# Patient Record
Sex: FEMALE | Race: WHITE | URBAN - METROPOLITAN AREA
[De-identification: names, ages, dates, MRNs, and addresses within clinical notes are randomized per-mention and may not be internally consistent; named-entity substitution may affect disease eponyms.]

---

## 2016-12-29 PROCEDURE — 93228 REMOTE 30 DAY ECG REV/REPORT: CPT | Performed by: INTERNAL MEDICINE

## 2017-01-09 ENCOUNTER — TELEPHONE (OUTPATIENT)
Dept: CARDIOLOGY CLINIC | Age: 42
End: 2017-01-09

## 2017-01-17 DIAGNOSIS — I47.1 SVT (SUPRAVENTRICULAR TACHYCARDIA) (HCC): ICD-10-CM

## 2017-08-01 ENCOUNTER — TELEPHONE (OUTPATIENT)
Dept: CARDIOLOGY CLINIC | Age: 42
End: 2017-08-01

## 2018-03-18 DIAGNOSIS — Z51.81 ENCOUNTER FOR MONITORING FLECAINIDE THERAPY: Primary | ICD-10-CM

## 2018-03-18 DIAGNOSIS — Z79.899 ENCOUNTER FOR MONITORING FLECAINIDE THERAPY: Primary | ICD-10-CM

## 2018-03-19 RX ORDER — DILTIAZEM HYDROCHLORIDE 120 MG/1
CAPSULE, EXTENDED RELEASE ORAL
Qty: 90 CAPSULE | Refills: 0 | Status: SHIPPED | OUTPATIENT
Start: 2018-03-19 | End: 2018-10-02 | Stop reason: SDUPTHER

## 2018-03-19 RX ORDER — FLECAINIDE ACETATE 50 MG/1
TABLET ORAL
Qty: 180 TABLET | Refills: 0 | Status: SHIPPED | OUTPATIENT
Start: 2018-03-19 | End: 2018-11-28 | Stop reason: ALTCHOICE

## 2018-03-19 NOTE — TELEPHONE ENCOUNTER
Last O/V:  6/13/16  Next O/V:  X- Last office note states to f/u prn    Last Labs:  BMP:  5/4/16    I called and spoke with the patient. She is going to have lab work done on Friday afternoon @ [facility] lab. Ordered placed. She states she will be calling back soon as well to schedule a yearly follow up.

## 2018-05-18 ENCOUNTER — OFFICE VISIT (OUTPATIENT)
Dept: FAMILY MEDICINE CLINIC | Age: 43
End: 2018-05-18

## 2018-05-18 VITALS
DIASTOLIC BLOOD PRESSURE: 76 MMHG | SYSTOLIC BLOOD PRESSURE: 120 MMHG | TEMPERATURE: 98.7 F | WEIGHT: 214.2 LBS | BODY MASS INDEX: 33.62 KG/M2 | HEIGHT: 67 IN

## 2018-05-18 DIAGNOSIS — Z13.1 SCREENING FOR DIABETES MELLITUS: ICD-10-CM

## 2018-05-18 DIAGNOSIS — Z00.00 PHYSICAL EXAM, ANNUAL: Primary | ICD-10-CM

## 2018-05-18 DIAGNOSIS — R53.82 CHRONIC FATIGUE: ICD-10-CM

## 2018-05-18 DIAGNOSIS — Z51.81 ENCOUNTER FOR MONITORING FLECAINIDE THERAPY: ICD-10-CM

## 2018-05-18 DIAGNOSIS — Z79.899 ENCOUNTER FOR MONITORING FLECAINIDE THERAPY: ICD-10-CM

## 2018-05-18 DIAGNOSIS — Z13.220 SCREENING FOR LIPID DISORDERS: ICD-10-CM

## 2018-05-18 LAB
ANION GAP SERPL CALCULATED.3IONS-SCNC: 16 MMOL/L (ref 3–16)
BASOPHILS ABSOLUTE: 0.1 K/UL (ref 0–0.2)
BASOPHILS RELATIVE PERCENT: 0.9 %
BUN BLDV-MCNC: 12 MG/DL (ref 7–20)
CALCIUM SERPL-MCNC: 9.2 MG/DL (ref 8.3–10.6)
CHLORIDE BLD-SCNC: 100 MMOL/L (ref 99–110)
CHOLESTEROL, TOTAL: 207 MG/DL (ref 0–199)
CO2: 25 MMOL/L (ref 21–32)
CREAT SERPL-MCNC: 0.8 MG/DL (ref 0.6–1.1)
EOSINOPHILS ABSOLUTE: 0.1 K/UL (ref 0–0.6)
EOSINOPHILS RELATIVE PERCENT: 1 %
GFR AFRICAN AMERICAN: >60
GFR NON-AFRICAN AMERICAN: >60
GLUCOSE BLD-MCNC: 75 MG/DL (ref 70–99)
GLUCOSE BLD-MCNC: 79 MG/DL (ref 70–99)
HCT VFR BLD CALC: 43.9 % (ref 36–48)
HDLC SERPL-MCNC: 55 MG/DL (ref 40–60)
HEMOGLOBIN: 15 G/DL (ref 12–16)
LDL CHOLESTEROL CALCULATED: 126 MG/DL
LYMPHOCYTES ABSOLUTE: 2.2 K/UL (ref 1–5.1)
LYMPHOCYTES RELATIVE PERCENT: 30.8 %
MCH RBC QN AUTO: 31.4 PG (ref 26–34)
MCHC RBC AUTO-ENTMCNC: 34.1 G/DL (ref 31–36)
MCV RBC AUTO: 92.2 FL (ref 80–100)
MONOCYTES ABSOLUTE: 0.6 K/UL (ref 0–1.3)
MONOCYTES RELATIVE PERCENT: 8.3 %
NEUTROPHILS ABSOLUTE: 4.2 K/UL (ref 1.7–7.7)
NEUTROPHILS RELATIVE PERCENT: 59 %
PDW BLD-RTO: 12.8 % (ref 12.4–15.4)
PLATELET # BLD: 293 K/UL (ref 135–450)
PMV BLD AUTO: 8.4 FL (ref 5–10.5)
POTASSIUM SERPL-SCNC: 4.2 MMOL/L (ref 3.5–5.1)
RBC # BLD: 4.76 M/UL (ref 4–5.2)
SODIUM BLD-SCNC: 141 MMOL/L (ref 136–145)
TRIGL SERPL-MCNC: 131 MG/DL (ref 0–150)
TSH SERPL DL<=0.05 MIU/L-ACNC: 1.81 UIU/ML (ref 0.27–4.2)
VLDLC SERPL CALC-MCNC: 26 MG/DL
WBC # BLD: 7.1 K/UL (ref 4–11)

## 2018-05-18 PROCEDURE — 99396 PREV VISIT EST AGE 40-64: CPT | Performed by: INTERNAL MEDICINE

## 2018-05-18 ASSESSMENT — PATIENT HEALTH QUESTIONNAIRE - PHQ9
SUM OF ALL RESPONSES TO PHQ QUESTIONS 1-9: 0
1. LITTLE INTEREST OR PLEASURE IN DOING THINGS: 0
2. FEELING DOWN, DEPRESSED OR HOPELESS: 0
SUM OF ALL RESPONSES TO PHQ9 QUESTIONS 1 & 2: 0

## 2018-05-18 ASSESSMENT — ENCOUNTER SYMPTOMS
SINUS PRESSURE: 0
RHINORRHEA: 0
VOMITING: 0
WHEEZING: 0
SINUS PAIN: 0
APNEA: 0
SORE THROAT: 0
DIARRHEA: 0
ABDOMINAL PAIN: 0
NAUSEA: 0
SHORTNESS OF BREATH: 0
COUGH: 0
BLOOD IN STOOL: 0
CONSTIPATION: 0

## 2018-10-02 RX ORDER — DILTIAZEM HYDROCHLORIDE 120 MG/1
120 CAPSULE, COATED, EXTENDED RELEASE ORAL DAILY
Qty: 30 CAPSULE | Refills: 1 | Status: SHIPPED | OUTPATIENT
Start: 2018-10-02 | End: 2018-10-02 | Stop reason: SDUPTHER

## 2018-11-28 ENCOUNTER — OFFICE VISIT (OUTPATIENT)
Dept: CARDIOLOGY CLINIC | Age: 43
End: 2018-11-28
Payer: COMMERCIAL

## 2018-11-28 VITALS
SYSTOLIC BLOOD PRESSURE: 100 MMHG | WEIGHT: 200 LBS | OXYGEN SATURATION: 98 % | HEIGHT: 67 IN | BODY MASS INDEX: 31.39 KG/M2 | DIASTOLIC BLOOD PRESSURE: 68 MMHG | HEART RATE: 70 BPM

## 2018-11-28 DIAGNOSIS — I47.1 ATRIAL TACHYCARDIA (HCC): ICD-10-CM

## 2018-11-28 DIAGNOSIS — I47.1 SVT (SUPRAVENTRICULAR TACHYCARDIA) (HCC): Primary | ICD-10-CM

## 2018-11-28 PROCEDURE — 99214 OFFICE O/P EST MOD 30 MIN: CPT | Performed by: INTERNAL MEDICINE

## 2018-11-28 PROCEDURE — 93000 ELECTROCARDIOGRAM COMPLETE: CPT | Performed by: INTERNAL MEDICINE

## 2021-01-06 ENCOUNTER — OFFICE VISIT (OUTPATIENT)
Dept: FAMILY MEDICINE CLINIC | Age: 46
End: 2021-01-06
Payer: COMMERCIAL

## 2021-01-06 VITALS
BODY MASS INDEX: 34.88 KG/M2 | SYSTOLIC BLOOD PRESSURE: 108 MMHG | TEMPERATURE: 97.3 F | HEIGHT: 67 IN | WEIGHT: 222.2 LBS | DIASTOLIC BLOOD PRESSURE: 60 MMHG

## 2021-01-06 DIAGNOSIS — Z00.00 PHYSICAL EXAM: Primary | ICD-10-CM

## 2021-01-06 DIAGNOSIS — Z13.1 SCREENING FOR DIABETES MELLITUS: ICD-10-CM

## 2021-01-06 DIAGNOSIS — Z13.220 SCREENING FOR LIPID DISORDERS: ICD-10-CM

## 2021-01-06 DIAGNOSIS — M25.50 ARTHRALGIA, UNSPECIFIED JOINT: ICD-10-CM

## 2021-01-06 PROCEDURE — 99396 PREV VISIT EST AGE 40-64: CPT | Performed by: INTERNAL MEDICINE

## 2021-01-06 SDOH — ECONOMIC STABILITY: INCOME INSECURITY: HOW HARD IS IT FOR YOU TO PAY FOR THE VERY BASICS LIKE FOOD, HOUSING, MEDICAL CARE, AND HEATING?: NOT HARD AT ALL

## 2021-01-06 SDOH — ECONOMIC STABILITY: TRANSPORTATION INSECURITY
IN THE PAST 12 MONTHS, HAS LACK OF TRANSPORTATION KEPT YOU FROM MEETINGS, WORK, OR FROM GETTING THINGS NEEDED FOR DAILY LIVING?: NO

## 2021-01-06 SDOH — ECONOMIC STABILITY: FOOD INSECURITY: WITHIN THE PAST 12 MONTHS, THE FOOD YOU BOUGHT JUST DIDN'T LAST AND YOU DIDN'T HAVE MONEY TO GET MORE.: NEVER TRUE

## 2021-01-06 ASSESSMENT — ENCOUNTER SYMPTOMS
APNEA: 0
BLOOD IN STOOL: 0
WHEEZING: 0
CONSTIPATION: 0
VOMITING: 0
SINUS PRESSURE: 0
NAUSEA: 0
DIARRHEA: 0
SORE THROAT: 0
ABDOMINAL PAIN: 0
COUGH: 0
SINUS PAIN: 0
SHORTNESS OF BREATH: 0
RHINORRHEA: 0

## 2021-01-06 ASSESSMENT — PATIENT HEALTH QUESTIONNAIRE - PHQ9
SUM OF ALL RESPONSES TO PHQ QUESTIONS 1-9: 0
SUM OF ALL RESPONSES TO PHQ QUESTIONS 1-9: 0

## 2021-01-06 NOTE — PROGRESS NOTES
Respiratory: Negative for apnea, cough, shortness of breath and wheezing. Cardiovascular: Negative for chest pain and palpitations. Gastrointestinal: Negative for abdominal pain, blood in stool, constipation, diarrhea, nausea and vomiting. Endocrine: Negative for polyuria. Genitourinary: Negative for dysuria, frequency, hematuria and urgency. Musculoskeletal: Negative for arthralgias and myalgias. Skin: Negative for rash. Neurological: Negative for dizziness, weakness, numbness and headaches. Hematological: Negative for adenopathy. Prior to Visit Medications    Medication Sig Taking?  Authorizing Provider   FLECAINIDE ACETATE PO Take by mouth daily Yes Historical Provider, MD   dilTIAZem HCl Coated Beads (DILTIAZEM CD PO) Take by mouth daily Yes Historical Provider, MD        Allergies   Allergen Reactions    Amoxicillin Rash       Past Medical History:   Diagnosis Date    Atrial tachycardia ([address]     s/p ablation    Palpitations        Past Surgical History:   Procedure Laterality Date    ATRIAL ABLATION SURGERY       SECTION      x 3    DILATION AND CURETTAGE OF UTERUS      JOINT REPLACEMENT      right hip    TONSILLECTOMY         Social History     Socioeconomic History    Marital status:      Spouse name: Not on file    Number of children: Not on file    Years of education: Not on file    Highest education level: Not on file   Occupational History    Not on file   Social Needs    Financial resource strain: Not hard at all   [employer] insecurity     Worry: Never true     Inability: Never true   [employer] needs     Medical: No     Non-medical: No   Tobacco Use    Smoking status: Never Smoker    Smokeless tobacco: Never Used   Substance and Sexual Activity    Alcohol use: Yes     Comment: occasional use    Drug use: No    Sexual activity: Not on file   Lifestyle    Physical activity     Days per week: Not on file     Minutes per session: Not on file  Stress: Not on file   Relationships    Social connections     Talks on phone: Not on file     Gets together: Not on file     Attends Mandaen service: Not on file     Active member of club or organization: Not on file     Attends meetings of clubs or organizations: Not on file     Relationship status: Not on file    Intimate partner violence     Fear of current or ex partner: Not on file     Emotionally abused: Not on file     Physically abused: Not on file     Forced sexual activity: Not on file   Other Topics Concern    Not on file   Social History Narrative    Not on file        Family History   Problem Relation Age of Onset    Diabetes Father     Cancer Father         throat cancer    Cancer Maternal Aunt         breast    Cancer Maternal Grandmother         breast/kidney       ADVANCE DIRECTIVE: N, <no information>    Vitals:    01/06/21 0852   Temp: 97.3 °F (36.3 °C)   TempSrc: Temporal   Weight: 222 lb 3.2 oz (100.8 kg)   Height: 5' 7\" (1.702 m)     Estimated body mass index is 34.8 kg/m² as calculated from the following:    Height as of this encounter: 5' 7\" (1.702 m). Weight as of this encounter: 222 lb 3.2 oz (100.8 kg). Physical Exam  Constitutional:       General: She is not in acute distress. Appearance: Normal appearance. She is not ill-appearing. HENT:      Head: Normocephalic. Right Ear: Tympanic membrane, ear canal and external ear normal.      Left Ear: Tympanic membrane, ear canal and external ear normal.   Eyes:      General: No scleral icterus. Right eye: No discharge. Left eye: No discharge. Extraocular Movements: Extraocular movements intact. Pupils: Pupils are equal, round, and reactive to light. Cardiovascular:      Rate and Rhythm: Normal rate. Heart sounds: No murmur. Pulmonary:      Effort: No respiratory distress. Breath sounds: No wheezing or rales. Abdominal:      General: There is no distension. Tenderness: There is no abdominal tenderness. There is no guarding. Musculoskeletal:         General: No swelling or deformity. Skin:     Coloration: Skin is not jaundiced. Findings: No bruising. Neurological:      Mental Status: She is alert. Motor: No weakness. Gait: Gait normal.   Psychiatric:         Mood and Affect: Mood normal.         Behavior: Behavior normal.         Thought Content: Thought content normal.         Judgment: Judgment normal.         No flowsheet data found. Lab Results   Component Value Date    CHOL 207 05/18/2018    CHOL 219 02/26/2016    CHOL 169 05/30/2012    TRIG 131 05/18/2018    TRIG 94 02/26/2016    TRIG 114 05/30/2012    HDL 55 05/18/2018    HDL 55 02/26/2016    HDL 50 05/30/2012    LDLCALC 126 05/18/2018    LDLCALC 145 02/26/2016    LDLCALC 96 05/30/2012    GLUCOSE 75 05/18/2018    GLUCOSE 79 05/18/2018    LABA1C 5.1 02/15/2010       The ASCVD Risk score (Morales Ortez, et al., 2013) failed to calculate for the following reasons: The systolic blood pressure is missing      There is no immunization history on file for this patient. Health Maintenance   Topic Date Due    Hepatitis C screen  1975    HIV screen  07/29/1989    DTaP/Tdap/Td vaccine (1 - Tdap) 07/29/1993    Cervical cancer screen  07/29/1995    Flu vaccine (1) 09/01/2020    Lipid screen  05/18/2023    Hepatitis A vaccine  Aged Out    Hepatitis B vaccine  Aged Out    Hib vaccine  Aged Out    Meningococcal (ACWY) vaccine  Aged Out    Pneumococcal 0-64 years Vaccine  Aged Out       ASSESSMENT/PLAN:  1. Screening for lipid disorders  2. Screening for diabetes mellitus     Diagnosis Orders   1. Physical exam     2. Screening for lipid disorders  Lipid Panel   3. Screening for diabetes mellitus  Glucose, Fasting   4.  Arthralgia, unspecified joint  Rheumatoid Factor    Sedimentation Rate     Outpatient Encounter Medications as of 1/6/2021   Medication Sig Dispense Refill  FLECAINIDE ACETATE PO Take by mouth daily      dilTIAZem HCl Coated Beads (DILTIAZEM CD PO) Take by mouth daily      [DISCONTINUED] Probiotic Product (PRO-BIOTIC BLEND PO) Take by mouth daily       No facility-administered encounter medications on file as of 1/6/2021. Orders Placed This Encounter   Procedures    Lipid Panel     Standing Status:   Future     Standing Expiration Date:   1/6/2022     Order Specific Question:   Is Patient Fasting?/# of Hours     Answer:   12    Glucose, Fasting     Standing Status:   Future     Standing Expiration Date:   1/6/2022    Rheumatoid Factor     Standing Status:   Future     Standing Expiration Date:   1/6/2022    Sedimentation Rate     Standing Status:   Future     Standing Expiration Date:   1/6/2022       No follow-ups on file.     An electronic signature was used to authenticate this note.    --Jeff Castro,  on 1/6/2021 at 8:57 AM

## 2021-01-07 LAB
CHOLESTEROL, TOTAL: 226 MG/DL (ref 0–199)
GLUCOSE FASTING: 68 MG/DL (ref 70–99)
HDLC SERPL-MCNC: 52 MG/DL (ref 40–60)
LDL CHOLESTEROL CALCULATED: 151 MG/DL
RHEUMATOID FACTOR: <10 IU/ML
SEDIMENTATION RATE, ERYTHROCYTE: 14 MM/HR (ref 0–20)
TRIGL SERPL-MCNC: 116 MG/DL (ref 0–150)
VLDLC SERPL CALC-MCNC: 23 MG/DL

## 2021-08-18 ENCOUNTER — OFFICE VISIT (OUTPATIENT)
Dept: FAMILY MEDICINE CLINIC | Age: 46
End: 2021-08-18
Payer: COMMERCIAL

## 2021-08-18 VITALS
DIASTOLIC BLOOD PRESSURE: 66 MMHG | WEIGHT: 194.4 LBS | TEMPERATURE: 98 F | HEIGHT: 67 IN | BODY MASS INDEX: 30.51 KG/M2 | SYSTOLIC BLOOD PRESSURE: 108 MMHG

## 2021-08-18 DIAGNOSIS — I47.1 SVT (SUPRAVENTRICULAR TACHYCARDIA) (HCC): ICD-10-CM

## 2021-08-18 DIAGNOSIS — M25.552 LEFT HIP PAIN: Primary | ICD-10-CM

## 2021-08-18 PROCEDURE — 99242 OFF/OP CONSLTJ NEW/EST SF 20: CPT | Performed by: INTERNAL MEDICINE

## 2021-08-18 ASSESSMENT — ENCOUNTER SYMPTOMS
ABDOMINAL PAIN: 0
DIARRHEA: 0
APNEA: 0
NAUSEA: 0
VOMITING: 0
WHEEZING: 0
COUGH: 0
SORE THROAT: 0
SHORTNESS OF BREATH: 0
RHINORRHEA: 0
BLOOD IN STOOL: 0
CONSTIPATION: 0
SINUS PRESSURE: 0
SINUS PAIN: 0

## 2021-08-18 ASSESSMENT — PATIENT HEALTH QUESTIONNAIRE - PHQ9
SUM OF ALL RESPONSES TO PHQ9 QUESTIONS 1 & 2: 0
2. FEELING DOWN, DEPRESSED OR HOPELESS: 0
1. LITTLE INTEREST OR PLEASURE IN DOING THINGS: 0
SUM OF ALL RESPONSES TO PHQ QUESTIONS 1-9: 0

## 2021-08-18 NOTE — PATIENT INSTRUCTIONS
Thank you for choosing [facility]. Please bring a current list of medications to every appointment. Please contact your pharmacy for any prescription refill(s) that you are requesting.

## 2021-08-18 NOTE — PROGRESS NOTES
2021    Sachin Vasquez (:  1975) is a 51 y.o. female, here for a preventive medicine evaluation. Chief Complaint   Patient presents with    Pre-op Exam     LEFT DIRECT ANTERIOR TOTAL HIP ARTHROPLASTY with Dr. Kj Anderson on 2021  fax: Lucy Montesinos is a 51 y.o. female [facility] the [facility]:  Patient Active Problem List    Diagnosis Date Noted    Chronic fatigue 2018    [address]T [facility]raventricular [address]) [address] [address] 2016    Fatigue 2012     Current Outpatient Medications   Medication Sig Dispense Refill    FLECAINIDE ACETATE PO Take by mouth daily      dilTIAZem HCl Coated Beads (DILTIAZEM CD PO) Take by mouth daily       No current facility-administered medications for this visit. Allergies: Amoxicillin  Past Medical History:   Diagnosis Date    Atrial tachycardia (HCC)     s/p ablation    Palpitations      Past Surgical History:   Procedure Laterality Date    ATRIAL ABLATION SURGERY       SECTION      x 3    DILATION AND CURETTAGE OF UTERUS      JOINT REPLACEMENT      right hip    TONSILLECTOMY       Family History   Problem Relation Age of Onset    Diabetes Father     Cancer Father         throat cancer    Cancer Maternal Aunt         breast    Cancer Maternal Grandmother         breast/kidney     Social History     Tobacco Use    Smoking status: Never Smoker    Smokeless tobacco: Never Used   Substance Use Topics    Alcohol use: Yes     Comment: occasional use     Patient Active Problem List   Diagnosis    Fatigue    SVT (supraventricular tachycardia) (HCC)    Chronic fatigue       Review of Systems   Constitutional: Negative for chills, diaphoresis, fatigue and fever. HENT: Negative for congestion, postnasal drip, rhinorrhea, sinus pressure, sinus pain and sore throat. Eyes: Negative for visual disturbance. Respiratory: Negative for apnea, cough, shortness of breath and wheezing. Cardiovascular: Negative for chest pain and palpitations. Gastrointestinal: Negative for abdominal pain, blood in stool, constipation, diarrhea, nausea and vomiting. Endocrine: Negative for polyuria. Genitourinary: Negative for dysuria, frequency, hematuria and urgency. Musculoskeletal:        Chronic L hip pain    Skin: Negative for rash. Neurological: Negative for dizziness, syncope, weakness, light-headedness, numbness and headaches. Hematological: Negative for adenopathy. Prior to Visit Medications    Medication Sig Taking?  Authorizing Provider   FLECAINIDE ACETATE PO Take by mouth daily Yes Historical Provider, MD   dilTIAZem HCl Coated Beads (DILTIAZEM CD PO) Take by mouth daily Yes Historical Provider, MD        Allergies   Allergen Reactions    Amoxicillin Rash       Past Medical History:   Diagnosis Date    Atrial tachycardia ([address]     s/p ablation    Palpitations        Past Surgical History:   Procedure Laterality Date    ATRIAL ABLATION SURGERY       SECTION      x 3    DILATION AND CURETTAGE OF UTERUS      JOINT REPLACEMENT      right hip    TONSILLECTOMY         Social History     Socioeconomic History    Marital status:      Spouse name: Not on file    Number of children: Not on file    Years of education: Not on file    Highest education level: Not on file   Occupational History    Not on file   Tobacco Use    Smoking status: Never Smoker    Smokeless tobacco: Never Used   Substance and Sexual Activity    Alcohol use: Yes     Comment: occasional use    Drug use: No    Sexual activity: Not on file   Other Topics Concern    Not on file   Social History Narrative    Not on file     Social Determinants of Health     Financial Resource Strain: Low Risk     Difficulty of Paying Living Expenses: Not hard at all   Food Insecurity: No Food Insecurity    Worried About [address] in the Last Year: Never true    [address] in the Last Year: Never true   Transportation Needs: No Transportation Needs    Lack of Transportation (Medical): No    Lack of Transportation (Non-Medical): No   Physical Activity:     Days of Exercise per Week:     Minutes of Exercise per Session:    Stress:     Feeling of Stress :    Social Connections:     Frequency of Communication with Friends and Family:     Frequency of Social Gatherings with Friends and Family:     Attends Spiritism Services:     Active Member of Clubs or Organizations:     Attends Club or Organization Meetings:     Marital Status:    Intimate Partner Violence:     Fear of Current or Ex-Partner:     Emotionally Abused:     Physically Abused:     Sexually Abused:         Family History   Problem Relation Age of Onset    Diabetes Father     Cancer Father         throat cancer    Cancer Maternal Aunt         breast    Cancer Maternal Grandmother         breast/kidney       ADVANCE DIRECTIVE: N, <no information>    Vitals:    08/18/21 0857   Temp: 98 °F (36.7 °C)   TempSrc: Temporal   Weight: 194 lb 6.4 oz (88.2 kg)   Height: 5' 7\" (1.702 m)     Estimated body mass index is 30.45 kg/m² as calculated from the following:    Height as of this encounter: 5' 7\" (1.702 m). Weight as of this encounter: 194 lb 6.4 oz (88.2 kg). Physical Exam  Vitals and nursing note reviewed. Constitutional:       General: She is not in acute distress. Appearance: Normal appearance. She is not ill-appearing or toxic-appearing. HENT:      Head: Normocephalic and atraumatic. Right Ear: Tympanic membrane, ear canal and external ear normal.      Left Ear: Tympanic membrane, ear canal and external ear normal.   Eyes:      General: No scleral icterus. Right eye: No discharge. Left eye: No discharge. Extraocular Movements: Extraocular movements intact. Pupils: Pupils are equal, round, and reactive to light. Cardiovascular:      Rate and Rhythm: Normal rate and regular rhythm. Heart sounds: No murmur heard. Pulmonary:      Effort: Pulmonary effort is normal. No respiratory distress. Breath sounds: Normal breath sounds. No wheezing or rhonchi. Abdominal:      General: There is no distension. Palpations: There is no mass. Tenderness: There is no abdominal tenderness. There is no guarding or rebound. Musculoskeletal:         General: No swelling or deformity. Cervical back: No rigidity. Comments: L hip pain with flexion and ext. Lymphadenopathy:      Cervical: No cervical adenopathy. Skin:     Coloration: Skin is not jaundiced. Findings: No bruising. Neurological:      General: No focal deficit present. Mental Status: She is alert. Cranial Nerves: No cranial nerve deficit. Motor: No weakness. Psychiatric:         Mood and Affect: Mood normal.         Behavior: Behavior normal.         Thought Content: Thought content normal.         Judgment: Judgment normal.         No flowsheet data found. Lab Results   Component Value Date    CHOL 226 01/06/2021    CHOL 207 05/18/2018    CHOL 219 02/26/2016    TRIG 116 01/06/2021    TRIG 131 05/18/2018    TRIG 94 02/26/2016    HDL 52 01/06/2021    HDL 55 05/18/2018    HDL 55 02/26/2016    HDL 50 05/30/2012    LDLCALC 151 01/06/2021    LDLCALC 126 05/18/2018    LDLCALC 145 02/26/2016    GLUF 68 01/06/2021    GLUCOSE 75 05/18/2018    GLUCOSE 79 05/18/2018    LABA1C 5.1 02/15/2010       The 10-year ASCVD risk score (Kassy Gomez et al., 2013) is: 0.9%    Values used to calculate the score:      Age: 51 years      Sex: Female      Is Non- : No      Diabetic: No      Tobacco smoker: No      Systolic Blood Pressure: 327 mmHg      Is BP treated: No      HDL Cholesterol: 52 mg/dL      Total Cholesterol: 226 mg/dL      There is no immunization history on file for this patient.     Health Maintenance   Topic Date Due    Hepatitis C screen  Never done    COVID-19 Vaccine (1) Never done    HIV screen  Never done    DTaP/Tdap/Td vaccine (1 - Tdap) Never done    Cervical cancer screen  Never done    Colon cancer screen colonoscopy  Never done    Flu vaccine (1) 09/01/2021    Diabetes screen  01/06/2024    Lipid screen  01/06/2026    Hepatitis A vaccine  Aged Out    Hepatitis B vaccine  Aged Out    Hib vaccine  Aged Out    Meningococcal (ACWY) vaccine  Aged Out    Pneumococcal 0-64 years Vaccine  Aged Out          ASSESSMENT/PLAN:   Diagnosis Orders   1. Left hip pain     2. SVT (supraventricular tachycardia) [facility])       Chief Complaint   Patient presents with    Pre-op Exam     LEFT DIRECT ANTERIOR TOTAL HIP ARTHROPLASTY with Dr. Reba Monsalve on 8/26/2021  fax: [phone]   Cleared for surgery .        An electronic signature was used to authenticate this note.    --Cherelle Fisher,  on 8/18/2021 at 9:03 AM

## 2021-10-06 ENCOUNTER — OFFICE VISIT (OUTPATIENT)
Dept: FAMILY MEDICINE CLINIC | Age: 46
End: 2021-10-06
Payer: COMMERCIAL

## 2021-10-06 VITALS
BODY MASS INDEX: 30.95 KG/M2 | HEIGHT: 67 IN | WEIGHT: 197.2 LBS | DIASTOLIC BLOOD PRESSURE: 62 MMHG | TEMPERATURE: 97.2 F | SYSTOLIC BLOOD PRESSURE: 118 MMHG

## 2021-10-06 DIAGNOSIS — I47.1 SVT (SUPRAVENTRICULAR TACHYCARDIA) (HCC): ICD-10-CM

## 2021-10-06 DIAGNOSIS — T81.31XA POSTOPERATIVE WOUND DEHISCENCE, INITIAL ENCOUNTER: Primary | ICD-10-CM

## 2021-10-06 PROCEDURE — 99212 OFFICE O/P EST SF 10 MIN: CPT | Performed by: INTERNAL MEDICINE

## 2021-10-06 RX ORDER — ACETAMINOPHEN 500 MG
TABLET ORAL
COMMUNITY

## 2021-10-06 ASSESSMENT — ENCOUNTER SYMPTOMS
BLOOD IN STOOL: 0
CONSTIPATION: 0
WHEEZING: 0
RHINORRHEA: 0
SHORTNESS OF BREATH: 0
ABDOMINAL PAIN: 0
NAUSEA: 0
SORE THROAT: 0
COUGH: 0
APNEA: 0
SINUS PRESSURE: 0
SINUS PAIN: 0

## 2021-10-06 ASSESSMENT — PATIENT HEALTH QUESTIONNAIRE - PHQ9
SUM OF ALL RESPONSES TO PHQ QUESTIONS 1-9: 0
SUM OF ALL RESPONSES TO PHQ QUESTIONS 1-9: 0
2. FEELING DOWN, DEPRESSED OR HOPELESS: 0
SUM OF ALL RESPONSES TO PHQ QUESTIONS 1-9: 0
SUM OF ALL RESPONSES TO PHQ9 QUESTIONS 1 & 2: 0
1. LITTLE INTEREST OR PLEASURE IN DOING THINGS: 0

## 2021-10-06 NOTE — PROGRESS NOTES
10/6/2021    Paulino Freitas (:  1975) is a 51 y.o. female, here for a preventive medicine evaluation. Chief Complaint   Patient presents with    Pre-op Exam     EXCISIONAL DEBRIDEMENT LEFT HIP WOUND, POSSIBLE DELAYED PRIMARY CLOSURE, POSSIBLE VAC DRESSING with Dr. Nadine Frazier  fax: [phone]     Paulino Freitas is a 51 y.o. female with the following history as recorded in [facility]:  Patient Active Problem List    Diagnosis Date Noted    Chronic fatigue 2018    SVT (supraventricular tachycardia) ([facility] [address] 2016    Fatigue 2012     Current Outpatient Medications   Medication Sig Dispense Refill    acetaminophen (TYLENOL) 500 MG tablet Take 2 by mouth twice daily      FLECAINIDE ACETATE PO Take by mouth daily      dilTIAZem HCl Coated Beads (DILTIAZEM CD PO) Take by mouth daily       No current facility-administered medications for this visit. Allergies: Amoxicillin  Past Medical History:   Diagnosis Date    Atrial tachycardia ([facility] [address]     s/p ablation    Palpitations      Past Surgical History:   Procedure Laterality Date    ATRIAL ABLATION SURGERY       SECTION      x 3    DILATION AND CURETTAGE OF UTERUS      JOINT REPLACEMENT Bilateral     right- , left     TONSILLECTOMY       Family History   Problem Relation Age of Onset    Diabetes Father     Cancer Father         throat cancer    Cancer Maternal Aunt         breast    Cancer Maternal Grandmother         breast/kidney     Social History     Tobacco Use    Smoking status: Never Smoker    Smokeless tobacco: Never Used   Substance Use Topics    Alcohol use: Yes     Comment: occasional use     Patient Active Problem List   Diagnosis    Fatigue    SVT (supraventricular tachycardia) (HCC)    Chronic fatigue       Review of Systems   Constitutional: Negative for chills, diaphoresis, fatigue and fever.    HENT: Negative for congestion, postnasal drip, rhinorrhea, sinus pressure, sinus pain and sore throat. Eyes: Negative for visual disturbance. Respiratory: Negative for apnea, cough, shortness of breath and wheezing. Cardiovascular: Negative for chest pain and palpitations. Gastrointestinal: Negative for abdominal pain, blood in stool, constipation and nausea. Endocrine: Negative for polyuria. Genitourinary: Negative for dysuria, frequency, hematuria and urgency. Skin: Negative for rash. Neurological: Negative for dizziness, tremors, light-headedness, numbness and headaches. Hematological: Negative for adenopathy. Prior to Visit Medications    Medication Sig Taking?  Authorizing Provider   acetaminophen (TYLENOL) 500 MG tablet Take 2 by mouth twice daily Yes Historical Provider, MD   FLECAINIDE ACETATE PO Take by mouth daily Yes Historical Provider, MD   dilTIAZem HCl Coated Beads (DILTIAZEM CD PO) Take by mouth daily Yes Historical Provider, MD        Allergies   Allergen Reactions    Amoxicillin Rash       Past Medical History:   Diagnosis Date    Atrial tachycardia ([address]     s/p ablation    Palpitations        Past Surgical History:   Procedure Laterality Date    ATRIAL ABLATION SURGERY       SECTION      x 3    DILATION AND CURETTAGE OF UTERUS      JOINT REPLACEMENT      right hip    TONSILLECTOMY         Social History     Socioeconomic History    Marital status:      Spouse name: Not on file    Number of children: Not on file    Years of education: Not on file    Highest education level: Not on file   Occupational History    Not on file   Tobacco Use    Smoking status: Never Smoker    Smokeless tobacco: Never Used   Substance and Sexual Activity    Alcohol use: Yes     Comment: occasional use    Drug use: No    Sexual activity: Not on file   Other Topics Concern    Not on file   Social History Narrative    Not on file     Social Determinants of Health     Financial Resource Strain: Low Risk     Difficulty of Paying Living Expenses: Not hard at all   Food Insecurity: No Food Insecurity    Worried About Running Out of Food in the Last Year: Never true    Ran Out of Food in the Last Year: Never true   Transportation Needs: No Transportation Needs    Lack of Transportation (Medical): No    Lack of Transportation (Non-Medical): No   Physical Activity:     Days of Exercise per Week:     Minutes of Exercise per Session:    Stress:     Feeling of Stress :    Social Connections:     Frequency of Communication with Friends and Family:     Frequency of Social Gatherings with Friends and Family:     Attends Latter day Services:     Active Member of Clubs or Organizations:     Attends Club or Organization Meetings:     Marital Status:    Intimate Partner Violence:     Fear of Current or Ex-Partner:     Emotionally Abused:     Physically Abused:     Sexually Abused:         Family History   Problem Relation Age of Onset    Diabetes Father     Cancer Father         throat cancer    Cancer Maternal Aunt         breast    Cancer Maternal Grandmother         breast/kidney       ADVANCE DIRECTIVE: N, <no information>    Vitals:    10/06/21 1450   Temp: 97.2 °F (36.2 °C)   TempSrc: Temporal   Weight: 197 lb 3.2 oz (89.4 kg)   Height: 5' 7\" (1.702 m)     Estimated body mass index is 30.89 kg/m² as calculated from the following:    Height as of this encounter: 5' 7\" (1.702 m). Weight as of this encounter: 197 lb 3.2 oz (89.4 kg). Physical Exam  Vitals and nursing note reviewed. Constitutional:       General: She is not in acute distress. Appearance: Normal appearance. She is not ill-appearing. HENT:      Head: Normocephalic and atraumatic. Right Ear: Tympanic membrane, ear canal and external ear normal.      Left Ear: Tympanic membrane, ear canal and external ear normal.   Eyes:      General: No scleral icterus. Right eye: No discharge. Left eye: No discharge. Extraocular Movements: Extraocular movements intact. Pupils: Pupils are equal, round, and reactive to light. Cardiovascular:      Rate and Rhythm: Normal rate and regular rhythm. Heart sounds: No murmur heard. Pulmonary:      Effort: Pulmonary effort is normal. No respiratory distress. Breath sounds: Normal breath sounds. No wheezing, rhonchi or rales. Abdominal:      General: There is no distension. Palpations: There is no mass. Tenderness: There is no abdominal tenderness. There is no guarding or rebound. Musculoskeletal:      Cervical back: No rigidity. Lymphadenopathy:      Cervical: No cervical adenopathy. Skin:     Coloration: Skin is not jaundiced. Neurological:      General: No focal deficit present. Mental Status: She is alert and oriented to person, place, and time. Cranial Nerves: No cranial nerve deficit. Motor: No weakness. Psychiatric:         Mood and Affect: Mood normal.         Behavior: Behavior normal.         Thought Content: Thought content normal.         Judgment: Judgment normal.         No flowsheet data found. Lab Results   Component Value Date    CHOL 226 01/06/2021    CHOL 207 05/18/2018    CHOL 219 02/26/2016    TRIG 116 01/06/2021    TRIG 131 05/18/2018    TRIG 94 02/26/2016    HDL 52 01/06/2021    HDL 55 05/18/2018    HDL 55 02/26/2016    HDL 50 05/30/2012    LDLCALC 151 01/06/2021    LDLCALC 126 05/18/2018    LDLCALC 145 02/26/2016    GLUF 68 01/06/2021    GLUCOSE 75 05/18/2018    GLUCOSE 79 05/18/2018    LABA1C 5.1 02/15/2010       The 10-year ASCVD risk score (Sherlyn Starr, et al., 2013) is: 0.9%    Values used to calculate the score:      Age: 51 years      Sex: Female      Is Non- : No      Diabetic: No      Tobacco smoker: No      Systolic Blood Pressure: 378 mmHg      Is BP treated: No      HDL Cholesterol: 52 mg/dL      Total Cholesterol: 226 mg/dL      There is no immunization history on file for this patient.     Health Maintenance   Topic Date Due  Hepatitis C screen  Never done    COVID-19 Vaccine (1) Never done    HIV screen  Never done    DTaP/Tdap/Td vaccine (1 - Tdap) Never done    Cervical cancer screen  Never done    Colon cancer screen colonoscopy  Never done    Flu vaccine (1) Never done    Diabetes screen  01/06/2024    Lipid screen  01/06/2026    Hepatitis A vaccine  Aged Out    Hepatitis B vaccine  Aged Out    Hib vaccine  Aged Out    Meningococcal (ACWY) vaccine  Aged Out    Pneumococcal 0-64 years Vaccine  Aged Out          ASSESSMENT/PLAN:   Diagnosis Orders   1. Postoperative wound dehiscence, initial encounter     2. [facility]T [facility]supra[facility]ricular tachycardia) [facility])       Chief Complaint   Patient presents with    Pre-op Exam     EXCISIONAL DEBRIDEMENT LEFT HIP WOUND, POSSIBLE DELAYED PRIMARY CLOSURE, POSSIBLE VAC DRESSING with Dr. Halle Martinez  fax: [phone]   Cleared for surgery .        An electronic signature was used to authenticate this note.    --Melony Howard, DO on 10/6/2021 at 2:53 PM

## 2021-10-06 NOTE — PATIENT INSTRUCTIONS
Thank you for choosing [facility]. Please bring a current list of medications to every appointment. Please contact your pharmacy for any prescription refill(s) that you are requesting.

## 2021-11-10 ENCOUNTER — TELEPHONE (OUTPATIENT)
Dept: FAMILY MEDICINE CLINIC | Age: 46
End: 2021-11-10

## 2021-11-10 NOTE — TELEPHONE ENCOUNTER
----- Message from [address] sent at 11/10/2021  9:02 AM EST -----  Subject: Message to Provider    QUESTIONS  Information for Provider? needs hep b records for current employer she   thinks this was done 15 years ago she is a current pt.  ---------------------------------------------------------------------------  --------------  [address]  What is the best way for the office to contact you? OK to leave message on   voicemail  Preferred Call Back Phone Number?  [phone]  ---------------------------------------------------------------------------  --------------  SCRIPT ANSWERS  undefined

## 2021-11-10 NOTE — TELEPHONE ENCOUNTER
Copy of Hepatitis B vaccine series from \"old records\" placed at . Patient advised and expressed understanding.